# Patient Record
Sex: FEMALE | Race: WHITE | NOT HISPANIC OR LATINO | URBAN - METROPOLITAN AREA
[De-identification: names, ages, dates, MRNs, and addresses within clinical notes are randomized per-mention and may not be internally consistent; named-entity substitution may affect disease eponyms.]

---

## 2019-04-29 ENCOUNTER — OUTPATIENT (OUTPATIENT)
Dept: OUTPATIENT SERVICES | Facility: HOSPITAL | Age: 44
LOS: 1 days | End: 2019-04-29

## 2019-04-29 VITALS
SYSTOLIC BLOOD PRESSURE: 110 MMHG | RESPIRATION RATE: 14 BRPM | TEMPERATURE: 98 F | DIASTOLIC BLOOD PRESSURE: 80 MMHG | HEART RATE: 70 BPM | WEIGHT: 145.95 LBS | HEIGHT: 67 IN

## 2019-04-29 DIAGNOSIS — N84.0 POLYP OF CORPUS UTERI: ICD-10-CM

## 2019-04-29 LAB
ANION GAP SERPL CALC-SCNC: 14 MMO/L — SIGNIFICANT CHANGE UP (ref 7–14)
BUN SERPL-MCNC: 21 MG/DL — SIGNIFICANT CHANGE UP (ref 7–23)
CALCIUM SERPL-MCNC: 9 MG/DL — SIGNIFICANT CHANGE UP (ref 8.4–10.5)
CHLORIDE SERPL-SCNC: 102 MMOL/L — SIGNIFICANT CHANGE UP (ref 98–107)
CO2 SERPL-SCNC: 23 MMOL/L — SIGNIFICANT CHANGE UP (ref 22–31)
CREAT SERPL-MCNC: 0.77 MG/DL — SIGNIFICANT CHANGE UP (ref 0.5–1.3)
GLUCOSE SERPL-MCNC: 123 MG/DL — HIGH (ref 70–99)
HCT VFR BLD CALC: 38.4 % — SIGNIFICANT CHANGE UP (ref 34.5–45)
HGB BLD-MCNC: 12.1 G/DL — SIGNIFICANT CHANGE UP (ref 11.5–15.5)
MCHC RBC-ENTMCNC: 30 PG — SIGNIFICANT CHANGE UP (ref 27–34)
MCHC RBC-ENTMCNC: 31.5 % — LOW (ref 32–36)
MCV RBC AUTO: 95 FL — SIGNIFICANT CHANGE UP (ref 80–100)
NRBC # FLD: 0 K/UL — SIGNIFICANT CHANGE UP (ref 0–0)
PLATELET # BLD AUTO: 219 K/UL — SIGNIFICANT CHANGE UP (ref 150–400)
PMV BLD: 11 FL — SIGNIFICANT CHANGE UP (ref 7–13)
POTASSIUM SERPL-MCNC: 3.6 MMOL/L — SIGNIFICANT CHANGE UP (ref 3.5–5.3)
POTASSIUM SERPL-SCNC: 3.6 MMOL/L — SIGNIFICANT CHANGE UP (ref 3.5–5.3)
RBC # BLD: 4.04 M/UL — SIGNIFICANT CHANGE UP (ref 3.8–5.2)
RBC # FLD: 11.8 % — SIGNIFICANT CHANGE UP (ref 10.3–14.5)
SODIUM SERPL-SCNC: 139 MMOL/L — SIGNIFICANT CHANGE UP (ref 135–145)
WBC # BLD: 11.05 K/UL — HIGH (ref 3.8–10.5)
WBC # FLD AUTO: 11.05 K/UL — HIGH (ref 3.8–10.5)

## 2019-04-29 NOTE — H&P PST ADULT - NEGATIVE ALLERGY TYPES
no reactions to medicines/no reactions to food/no reactions to insect bites/no outdoor environmental allergies/no indoor environmental allergies/no reactions to animals

## 2019-04-29 NOTE — H&P PST ADULT - NEGATIVE NEUROLOGICAL SYMPTOMS
no weakness/no generalized seizures/no difficulty walking/no headache/no paresthesias/no transient paralysis/no syncope/no focal seizures/no tremors/no vertigo/no loss of sensation

## 2019-04-29 NOTE — H&P PST ADULT - RS GEN PE MLT RESP DETAILS PC
no wheezes/clear to auscultation bilaterally/no rhonchi/good air movement/airway patent/respirations non-labored/no rales/breath sounds equal

## 2019-04-29 NOTE — H&P PST ADULT - NEGATIVE GASTROINTESTINAL SYMPTOMS
no vomiting/no constipation/no melena/no nausea/no change in bowel habits/no diarrhea/no abdominal pain

## 2019-04-29 NOTE — H&P PST ADULT - NEGATIVE BREAST SYMPTOMS
no breast tenderness L/no breast lump L/no nipple discharge R/no breast tenderness R/no breast lump R/no nipple discharge L

## 2019-04-29 NOTE — H&P PST ADULT - MUSCULOSKELETAL
details… detailed exam no calf tenderness/normal strength/ROM intact/no joint warmth/no joint erythema/no joint swelling

## 2019-04-29 NOTE — H&P PST ADULT - NSANTHOSAYNRD_GEN_A_CORE
No. FARIHA screening performed.  STOP BANG Legend: 0-2 = LOW Risk; 3-4 = INTERMEDIATE Risk; 5-8 = HIGH Risk

## 2019-04-29 NOTE — H&P PST ADULT - NEGATIVE MUSCULOSKELETAL SYMPTOMS
no arthritis/no stiffness/no arm pain L/no arm pain R/no muscle cramps/no muscle weakness/no neck pain/no joint swelling/no myalgia/no back pain/no leg pain L/no leg pain R

## 2019-04-29 NOTE — H&P PST ADULT - NEGATIVE OPHTHALMOLOGIC SYMPTOMS
no diplopia/no photophobia/no blurred vision L/no pain R/no blurred vision R/no discharge L/no discharge R/no irritation L/no irritation R/no pain L

## 2019-04-29 NOTE — H&P PST ADULT - NEGATIVE GENERAL GENITOURINARY SYMPTOMS
no flank pain R/no incontinence/no urinary hesitancy/no hematuria/normal urinary frequency/no flank pain L/no urine discoloration/no dysuria

## 2019-04-29 NOTE — H&P PST ADULT - HISTORY OF PRESENT ILLNESS
Patient is a 43 year old female presents to [facility] for an evaluation for a scheduled Dilation Curettage hysteroscopy with Dr. Lopez. Pre op diagnosis; Polyp of corpus uteri. Patient reports in the past 6 months has been experiencing prolong menses and ' heavy bleeding ". Patient was s/p Abdominal and transvaginal sonogram with abnormal findings per patient. Patient is a 43 year old female presents to [facility] for an evaluation for a scheduled Dilation Curettage hysteroscopy with Dr. Lopez. Pre op diagnosis; Polyp of corpus uteri. Patient reports in the past 6 months has been experiencing prolong menses and menorrhagia. Patient was s/p Abdominal and transvaginal sonogram with abnormal findings per patient.

## 2019-04-29 NOTE — H&P PST ADULT - ASSESSMENT
Patient is scheduled for  Dilation Curettage hysteroscopy with Dr. Lopez. Pre op diagnosis; Polyp of corpus uteri.

## 2019-04-29 NOTE — H&P PST ADULT - NEGATIVE ENMT SYMPTOMS
no hearing difficulty/no throat pain/no dysphagia/no nasal obstruction/no post-nasal discharge/no abnormal taste sensation/no gum bleeding/no nasal discharge/no nasal congestion/no ear pain/no nose bleeds/no recurrent cold sores/no vertigo/no sinus symptoms/no tinnitus

## 2019-04-29 NOTE — H&P PST ADULT - NSICDXPROBLEM_GEN_ALL_CORE_FT
PROBLEM DIAGNOSES  Problem: Polyp of corpus uteri  Assessment and Plan: Patient is scheduled for  Dilation Curettage hysteroscopy with Dr. Lopez.  Preop instructions, pepcid provided. Pt stated understanding.

## 2019-05-17 PROBLEM — N84.0 POLYP OF CORPUS UTERI: Chronic | Status: ACTIVE | Noted: 2019-04-29

## 2019-05-21 ENCOUNTER — TRANSCRIPTION ENCOUNTER (OUTPATIENT)
Age: 44
End: 2019-05-21

## 2019-05-22 ENCOUNTER — OUTPATIENT (OUTPATIENT)
Dept: OUTPATIENT SERVICES | Facility: HOSPITAL | Age: 44
LOS: 1 days | Discharge: HOME | End: 2019-05-22
Payer: COMMERCIAL

## 2019-05-22 ENCOUNTER — RESULT REVIEW (OUTPATIENT)
Age: 44
End: 2019-05-22

## 2019-05-22 VITALS
RESPIRATION RATE: 20 BRPM | SYSTOLIC BLOOD PRESSURE: 123 MMHG | OXYGEN SATURATION: 96 % | TEMPERATURE: 97 F | HEART RATE: 60 BPM | DIASTOLIC BLOOD PRESSURE: 78 MMHG

## 2019-05-22 VITALS
TEMPERATURE: 98 F | OXYGEN SATURATION: 100 % | WEIGHT: 145.95 LBS | SYSTOLIC BLOOD PRESSURE: 138 MMHG | HEART RATE: 65 BPM | RESPIRATION RATE: 16 BRPM | DIASTOLIC BLOOD PRESSURE: 86 MMHG | HEIGHT: 67 IN

## 2019-05-22 DIAGNOSIS — N92.0 EXCESSIVE AND FREQUENT MENSTRUATION WITH REGULAR CYCLE: ICD-10-CM

## 2019-05-22 LAB — HCG UR QL: NEGATIVE — SIGNIFICANT CHANGE UP

## 2019-05-22 PROCEDURE — 88305 TISSUE EXAM BY PATHOLOGIST: CPT | Mod: 26

## 2019-05-22 RX ORDER — SODIUM CHLORIDE 9 MG/ML
1000 INJECTION, SOLUTION INTRAVENOUS
Refills: 0 | Status: DISCONTINUED | OUTPATIENT
Start: 2019-05-22 | End: 2019-06-06

## 2019-05-22 NOTE — ASU DISCHARGE PLAN (ADULT/PEDIATRIC) - CALL YOUR DOCTOR IF YOU HAVE ANY OF THE FOLLOWING:
Pain not relieved by Medications/Nausea and vomiting that does not stop/Unable to urinate/Inability to tolerate liquids or foods/Fever greater than (need to indicate Fahrenheit or Celsius)/100.4/Bleeding that does not stop

## 2019-05-22 NOTE — ASU DISCHARGE PLAN (ADULT/PEDIATRIC) - CARE PROVIDER_API CALL
Wilma Lopez)  Obstetrics and Gynecology  [address] [place] [address]  Phone: [phone]  Fax: [phone]  Follow Up Time:

## 2019-05-28 LAB — SURGICAL PATHOLOGY STUDY: SIGNIFICANT CHANGE UP

## 2019-05-29 ENCOUNTER — TRANSCRIPTION ENCOUNTER (OUTPATIENT)
Age: 44
End: 2019-05-29

## 2020-02-06 ENCOUNTER — RESULT REVIEW (OUTPATIENT)
Age: 45
End: 2020-02-06

## 2021-08-24 NOTE — H&P PST ADULT - EXTREMITIES
LATE ENTRY DUEN TO PT CARE./

EKG COMPLETED AT [facility]. EKG PRINT OUT GIVEN TO GEORGE detailed exam

## 2022-04-11 NOTE — H&P PST ADULT - ALCOHOL USE HISTORY SINGLE SELECT
Stephy Rubin was able to speak to the patients wife and offered a cancellation Dr Susu Ralph had for next week  Please Email me the consent if you have one   Thank you yes...

## 2022-05-14 ENCOUNTER — NON-APPOINTMENT (OUTPATIENT)
Age: 47
End: 2022-05-14

## 2022-11-04 ENCOUNTER — NON-APPOINTMENT (OUTPATIENT)
Age: 47
End: 2022-11-04

## 2024-09-24 NOTE — ASU PATIENT PROFILE, ADULT - NS TRANSFER PATIENT BELONGINGS
----- Message from Slava Winters PA-C sent at 9/24/2024  3:21 PM EDT -----  Eye exam normal   Clothing

## 2025-06-18 NOTE — ASU PATIENT PROFILE, ADULT - CENTRAL VENOUS CATHETER
[FreeTextEntry1] : Anticipatory guidance and parent education given.  Symptoms likely due to viral URI.  Recommend supportive care including increased fluids, rest, and nasal saline followed by nasal suction.  ER for concerns/fever as discussed Return if symptoms worsen or persist.  no